# Patient Record
Sex: MALE | Employment: OTHER | URBAN - METROPOLITAN AREA
[De-identification: names, ages, dates, MRNs, and addresses within clinical notes are randomized per-mention and may not be internally consistent; named-entity substitution may affect disease eponyms.]

---

## 2019-11-08 ENCOUNTER — HOSPITAL ENCOUNTER (EMERGENCY)
Facility: CLINIC | Age: 39
Discharge: HOME | End: 2019-11-09
Attending: EMERGENCY MEDICINE | Admitting: EMERGENCY MEDICINE

## 2019-11-08 DIAGNOSIS — R10.10 PAIN OF UPPER ABDOMEN: ICD-10-CM

## 2019-11-08 DIAGNOSIS — K56.609 SMALL BOWEL OBSTRUCTION (H): ICD-10-CM

## 2019-11-08 PROCEDURE — 96374 THER/PROPH/DIAG INJ IV PUSH: CPT | Mod: 59

## 2019-11-08 PROCEDURE — 99285 EMERGENCY DEPT VISIT HI MDM: CPT | Mod: 25

## 2019-11-08 PROCEDURE — 96376 TX/PRO/DX INJ SAME DRUG ADON: CPT

## 2019-11-08 PROCEDURE — 25000128 H RX IP 250 OP 636: Performed by: NURSE PRACTITIONER

## 2019-11-08 PROCEDURE — 83690 ASSAY OF LIPASE: CPT | Performed by: NURSE PRACTITIONER

## 2019-11-08 PROCEDURE — 96375 TX/PRO/DX INJ NEW DRUG ADDON: CPT

## 2019-11-08 PROCEDURE — 85025 COMPLETE CBC W/AUTO DIFF WBC: CPT | Performed by: NURSE PRACTITIONER

## 2019-11-08 PROCEDURE — 80053 COMPREHEN METABOLIC PANEL: CPT | Performed by: NURSE PRACTITIONER

## 2019-11-08 RX ORDER — KETOROLAC TROMETHAMINE 15 MG/ML
15 INJECTION, SOLUTION INTRAMUSCULAR; INTRAVENOUS ONCE
Status: COMPLETED | OUTPATIENT
Start: 2019-11-08 | End: 2019-11-09

## 2019-11-08 RX ORDER — ONDANSETRON 2 MG/ML
8 INJECTION INTRAMUSCULAR; INTRAVENOUS EVERY 30 MIN PRN
Status: DISCONTINUED | OUTPATIENT
Start: 2019-11-08 | End: 2019-11-09 | Stop reason: HOSPADM

## 2019-11-08 RX ADMIN — ONDANSETRON HYDROCHLORIDE 8 MG: 2 INJECTION, SOLUTION INTRAMUSCULAR; INTRAVENOUS at 23:57

## 2019-11-08 SDOH — HEALTH STABILITY: MENTAL HEALTH: HOW OFTEN DO YOU HAVE A DRINK CONTAINING ALCOHOL?: NEVER

## 2019-11-08 NOTE — LETTER
11/09/19      To Whom it may concern:    Timo Garcia was in our Emergency Department today, 11/09/19. with a patient who needed their assistance.  Please excuse them from work/school.      Sincerely,

## 2019-11-08 NOTE — ED AVS SNAPSHOT
[facility] Emergency Department  [address]  [facility] [address]  Phone:  [phone]  Fax:  [phone]                                    Pramod Caldera   MRN: [id number]    Department:  [facility] Emergency Department   Date of Visit:  11/8/2019           After Visit Summary Signature Page    I have received my discharge instructions, and my questions have been answered. I have discussed any challenges I see with this plan with the nurse or doctor.    ..........................................................................................................................................  Patient/Patient Representative Signature      ..........................................................................................................................................  Patient Representative Print Name and Relationship to Patient    ..................................................               ................................................  Date                                   Time    ..........................................................................................................................................  Reviewed by Signature/Title    ...................................................              ..............................................  Date                                               Time          22EPIC Rev 08/18

## 2019-11-09 ENCOUNTER — APPOINTMENT (OUTPATIENT)
Dept: ULTRASOUND IMAGING | Facility: CLINIC | Age: 39
End: 2019-11-09
Attending: NURSE PRACTITIONER

## 2019-11-09 ENCOUNTER — APPOINTMENT (OUTPATIENT)
Dept: CT IMAGING | Facility: CLINIC | Age: 39
End: 2019-11-09
Attending: NURSE PRACTITIONER

## 2019-11-09 VITALS
TEMPERATURE: 98.7 F | SYSTOLIC BLOOD PRESSURE: 132 MMHG | RESPIRATION RATE: 18 BRPM | DIASTOLIC BLOOD PRESSURE: 68 MMHG | OXYGEN SATURATION: 100 % | WEIGHT: 187 LBS

## 2019-11-09 LAB
ALBUMIN SERPL-MCNC: 4.2 G/DL (ref 3.4–5)
ALP SERPL-CCNC: 75 U/L (ref 40–150)
ALT SERPL W P-5'-P-CCNC: 36 U/L (ref 0–70)
ANION GAP SERPL CALCULATED.3IONS-SCNC: 5 MMOL/L (ref 3–14)
AST SERPL W P-5'-P-CCNC: 23 U/L (ref 0–45)
BASOPHILS # BLD AUTO: 0 10E9/L (ref 0–0.2)
BASOPHILS NFR BLD AUTO: 0.4 %
BILIRUB SERPL-MCNC: 0.5 MG/DL (ref 0.2–1.3)
BUN SERPL-MCNC: 13 MG/DL (ref 7–30)
CALCIUM SERPL-MCNC: 8.7 MG/DL (ref 8.5–10.1)
CHLORIDE SERPL-SCNC: 104 MMOL/L (ref 94–109)
CO2 SERPL-SCNC: 29 MMOL/L (ref 20–32)
CREAT SERPL-MCNC: 0.86 MG/DL (ref 0.66–1.25)
DIFFERENTIAL METHOD BLD: NORMAL
EOSINOPHIL # BLD AUTO: 0 10E9/L (ref 0–0.7)
EOSINOPHIL NFR BLD AUTO: 0.3 %
ERYTHROCYTE [DISTWIDTH] IN BLOOD BY AUTOMATED COUNT: 12.3 % (ref 10–15)
GFR SERPL CREATININE-BSD FRML MDRD: >90 ML/MIN/{1.73_M2}
GLUCOSE SERPL-MCNC: 109 MG/DL (ref 70–99)
HCT VFR BLD AUTO: 44.7 % (ref 40–53)
HGB BLD-MCNC: 14.3 G/DL (ref 13.3–17.7)
IMM GRANULOCYTES # BLD: 0 10E9/L (ref 0–0.4)
IMM GRANULOCYTES NFR BLD: 0.3 %
LIPASE SERPL-CCNC: 140 U/L (ref 73–393)
LYMPHOCYTES # BLD AUTO: 1.7 10E9/L (ref 0.8–5.3)
LYMPHOCYTES NFR BLD AUTO: 16.3 %
MCH RBC QN AUTO: 29.2 PG (ref 26.5–33)
MCHC RBC AUTO-ENTMCNC: 32 G/DL (ref 31.5–36.5)
MCV RBC AUTO: 91 FL (ref 78–100)
MONOCYTES # BLD AUTO: 0.7 10E9/L (ref 0–1.3)
MONOCYTES NFR BLD AUTO: 6.4 %
NEUTROPHILS # BLD AUTO: 8 10E9/L (ref 1.6–8.3)
NEUTROPHILS NFR BLD AUTO: 76.3 %
NRBC # BLD AUTO: 0 10*3/UL
NRBC BLD AUTO-RTO: 0 /100
PLATELET # BLD AUTO: 186 10E9/L (ref 150–450)
POTASSIUM SERPL-SCNC: 3.7 MMOL/L (ref 3.4–5.3)
PROT SERPL-MCNC: 7.8 G/DL (ref 6.8–8.8)
RBC # BLD AUTO: 4.89 10E12/L (ref 4.4–5.9)
SODIUM SERPL-SCNC: 138 MMOL/L (ref 133–144)
WBC # BLD AUTO: 10.5 10E9/L (ref 4–11)

## 2019-11-09 PROCEDURE — 25000125 ZZHC RX 250: Performed by: EMERGENCY MEDICINE

## 2019-11-09 PROCEDURE — 74177 CT ABD & PELVIS W/CONTRAST: CPT

## 2019-11-09 PROCEDURE — 25000128 H RX IP 250 OP 636: Performed by: EMERGENCY MEDICINE

## 2019-11-09 PROCEDURE — 76705 ECHO EXAM OF ABDOMEN: CPT

## 2019-11-09 PROCEDURE — 25000128 H RX IP 250 OP 636: Performed by: NURSE PRACTITIONER

## 2019-11-09 RX ORDER — HYDROMORPHONE HYDROCHLORIDE 1 MG/ML
0.5 INJECTION, SOLUTION INTRAMUSCULAR; INTRAVENOUS; SUBCUTANEOUS
Status: COMPLETED | OUTPATIENT
Start: 2019-11-09 | End: 2019-11-09

## 2019-11-09 RX ORDER — IOPAMIDOL 755 MG/ML
500 INJECTION, SOLUTION INTRAVASCULAR ONCE
Status: COMPLETED | OUTPATIENT
Start: 2019-11-09 | End: 2019-11-09

## 2019-11-09 RX ORDER — ONDANSETRON 8 MG/1
8 TABLET, ORALLY DISINTEGRATING ORAL EVERY 6 HOURS PRN
Qty: 10 TABLET | Refills: 0 | Status: SHIPPED | OUTPATIENT
Start: 2019-11-09 | End: 2019-11-16

## 2019-11-09 RX ORDER — HYDROCODONE BITARTRATE AND ACETAMINOPHEN 5; 325 MG/1; MG/1
1 TABLET ORAL EVERY 6 HOURS PRN
Qty: 10 TABLET | Refills: 0 | Status: SHIPPED | OUTPATIENT
Start: 2019-11-09

## 2019-11-09 RX ADMIN — IOPAMIDOL 94 ML: 755 INJECTION, SOLUTION INTRAVENOUS at 01:42

## 2019-11-09 RX ADMIN — HYDROMORPHONE HYDROCHLORIDE 0.5 MG: 1 INJECTION, SOLUTION INTRAMUSCULAR; INTRAVENOUS; SUBCUTANEOUS at 00:24

## 2019-11-09 RX ADMIN — KETOROLAC TROMETHAMINE 15 MG: 15 INJECTION, SOLUTION INTRAMUSCULAR; INTRAVENOUS at 00:00

## 2019-11-09 RX ADMIN — HYDROMORPHONE HYDROCHLORIDE 0.5 MG: 1 INJECTION, SOLUTION INTRAMUSCULAR; INTRAVENOUS; SUBCUTANEOUS at 02:22

## 2019-11-09 RX ADMIN — SODIUM CHLORIDE 64 ML: 9 INJECTION, SOLUTION INTRAVENOUS at 01:42

## 2019-11-09 ASSESSMENT — ENCOUNTER SYMPTOMS
VOMITING: 0
DIARRHEA: 0
NAUSEA: 1
BACK PAIN: 0
ABDOMINAL PAIN: 1
DIZZINESS: 0
DIFFICULTY URINATING: 0
SHORTNESS OF BREATH: 0

## 2019-11-09 NOTE — ED NOTES
Emergency Department Attending Supervision Note  11/8/2019  11:41 PM      I evaluated this patient in conjunction with Laurence Zambrano NP      Briefly, Pramod Caldera is a 39 year old male who presents with acute onset of abdominal pain after eating pizza this evening. He has had no nausea or vomiting with it. Pain is mostly in the upper abdomen with his initial report.       On my exam, patient in discomfort in left lower and right lower abdomen and somewhat in the epigastric region as well. His abdomen was soft with no obvious distension. Bowel sounds were hypoactive. Heart and lungs were normal. He was not distressed.       Results:  CT Abdomen Pelvis w Contrast   Preliminary Result   IMPRESSION:    1.  Probable mild mid to distal small bowel obstruction: There is a moderately long segment of fluid-filled mildly distended small bowel in the lower abdomen and pelvis and the more distal bowel is relatively decompressed.   2.  No other cause of acute pain identified in the abdomen or pelvis. Normal appendix.       US Abdomen Limited (RUQ)   Final Result   IMPRESSION:   1.  Contracted gallbladder, suboptimally assessed. Recommend repeat study after fasting.   2.  The rest of the exam is negative.        Labs Ordered and Resulted from Time of ED Arrival Up to the Time of Departure from the ED   COMPREHENSIVE METABOLIC PANEL - Abnormal; Notable for the following components:       Result Value    Glucose 109 (*)     All other components within normal limits   CBC WITH PLATELETS DIFFERENTIAL   LIPASE   PERIPHERAL IV CATHETER          ED course:  Past medical records, nursing notes, and vitals reviewed.  2350: I performed an exam of the patient and obtained history, as documented above.     IV inserted and blood drawn.     The patient was sent for an abdomen pelvis CT and abdomen ultra sound while in the emergency department, findings above.    Findings and plan explained to the Patient. Patient discharged  home with instructions regarding supportive care, medications, and reasons to return. The importance of close follow-up was reviewed.        My impression:  Pramod Caldera is a 39 year old male who presented with acute onset of abdominal pain. Based on recent eating and location of his reported pain initially ultra sound of the right upper quadrant was obtained as well as lab tests. This did not show any evidence to suggest his gall bladder has any cause in his pain and on recheck his pain seemed more lower abdomen therefore CT imaging was obtained. This showed findings that could represent a mild mid distal small bowel obstruction as there appeared to be a transition point. There is no other abnormal findings to suggest the cause of his pain.  Patient has been feeling significantly better.  He was offered admission for observation but declined and wanted to be discharged home.  He is to stay on a liquid diet the next 24 hours and slowly advance his diet.  He understands reasons to return to the ED.       Diagnosis    ICD-10-CM    1. Pain of upper abdomen R10.10    2. Small bowel obstruction (H) K56.609        Scribe Disclosure:  I, Theo Jackson, am serving as a scribe at 11:42 PM on 11/8/2019 to document services personally performed by Marvin Hdz MD based on my observations and the provider's statements to me.      Marvin Hdz MD Mailander, Lucas P, MD  11/09/19 2006

## 2019-11-09 NOTE — ED PROVIDER NOTES
History     Chief Complaint:    Abdominal Pain      HPI   Pramod Do is a 39 year old male who presents with sudden onset of upper abdominal pain after eating pizza. Denies CP, SOB, vomiting, diarrhea, fevers, or chills.  Denies smoking or ETOH use.    Allergies:    No Known Allergies     Medications:      HYDROcodone-acetaminophen (NORCO) 5-325 MG tablet  ondansetron (ZOFRAN-ODT) 8 MG ODT tab        Problem List:      There are no active problems to display for this patient.       Past Medical History:      History reviewed. No pertinent past medical history.    Past Surgical History:      History reviewed. No pertinent surgical history.    Family History:      History reviewed. No pertinent family history.    Social History:    Marital Status:    Social History     Tobacco Use     Smoking status: Never Smoker     Smokeless tobacco: Never Used   Substance Use Topics     Alcohol use: Never     Frequency: Never     Drug use: Never        Review of Systems   Respiratory: Negative for shortness of breath.    Cardiovascular: Negative for chest pain and leg swelling.   Gastrointestinal: Positive for abdominal pain and nausea. Negative for diarrhea and vomiting.   Genitourinary: Negative for difficulty urinating.   Musculoskeletal: Negative for back pain.   Neurological: Negative for dizziness.         Physical Exam   First Vitals:  BP: (!) 149/89  Heart Rate: 71  Temp: 98.7  F (37.1  C)  Resp: 16  Weight: 84.8 kg (187 lb)  SpO2: 98 %      Physical Exam  Constitutional:       General: He is in acute distress.      Appearance: He is well-developed and normal weight. He is not ill-appearing or toxic-appearing.   HENT:      Mouth/Throat:      Mouth: Mucous membranes are moist.   Cardiovascular:      Rate and Rhythm: Normal rate and regular rhythm.      Heart sounds: Normal heart sounds.   Pulmonary:      Effort: Pulmonary effort is normal. No respiratory distress.      Breath sounds: Normal breath sounds.    Abdominal:      General: Abdomen is flat. Bowel sounds are normal. There is no distension. There are no signs of injury.      Palpations: Abdomen is soft. There is no hepatomegaly or splenomegaly.      Tenderness: There is tenderness in the right upper quadrant and left upper quadrant. There is no right CVA tenderness, left CVA tenderness or guarding. Negative signs include Torres's sign and McBurney's sign.      Hernia: No hernia is present.   Skin:     General: Skin is warm and dry.   Neurological:      General: No focal deficit present.      Mental Status: He is alert and oriented to person, place, and time.   Psychiatric:         Mood and Affect: Mood normal.         Behavior: Behavior normal.           Emergency Department Course   Imaging:  CT Abdomen Pelvis w Contrast   Final Result   IMPRESSION:    1.  Probable mild mid to distal small bowel obstruction: There is a moderately long segment of fluid-filled mildly distended small bowel in the lower abdomen and pelvis and the more distal bowel is relatively decompressed.   2.  No other cause of acute pain identified in the abdomen or pelvis. Normal appendix.       US Abdomen Limited (RUQ)   Final Result   IMPRESSION:   1.  Contracted gallbladder, suboptimally assessed. Recommend repeat study after fasting.   2.  The rest of the exam is negative.      Laboratory:  Labs Ordered and Resulted from Time of ED Arrival Up to the Time of Departure from the ED   COMPREHENSIVE METABOLIC PANEL - Abnormal; Notable for the following components:       Result Value    Glucose 109 (*)     All other components within normal limits   CBC WITH PLATELETS DIFFERENTIAL   LIPASE   PERIPHERAL IV CATHETER                    Impression & Plan            Medical Decision Making:Pramod Caldera presents with abdominal pain.  Discussed with him admission for small bowel obstruction but patient prefers to go home as he lives 5 minutes from ED and will return if needed.  He agrees to  clear liquid diet for the next 24 hours for bowel rest with the exception of pain and nausea medication. If unable to tolerate, patient states he will return to ED. The differential diagnosis may include:  appendicitis, cholecystitis, peptic ulcer disease, diverticulitis,  pancreatitis, amongst others.  Based on clinical exam, laboratory testing, and US/CT imaging, there is no evidence of appendicitis, pancreatitis, or cholecystitis.   The pain and nausea has improved with interventions in the ED.  He will be discharged, and instructed that if he has any persistent, worsening or new symptoms he should return to ED IMMEDIATELY.  He will establish primary care for follow-up on Monday.  Patient has verbalized understanding of discharge and all questions were answered.  Diagnosis:    ICD-10-CM    1. Pain of upper abdomen R10.10    2. Small bowel obstruction (H) K56.609        Disposition:  discharged to home with wife.      Discharge Medications:  New Prescriptions    HYDROCODONE-ACETAMINOPHEN (NORCO) 5-325 MG TABLET    Take 1 tablet by mouth every 6 hours as needed for severe pain    ONDANSETRON (ZOFRAN-ODT) 8 MG ODT TAB    Take 1 tablet (8 mg) by mouth every 6 hours as needed for nausea       Laurence Quintanilla NP  11/8/2019   [facility] EMERGENCY DEPARTMENT       Laurence Zambrano NP  11/09/19 0229     See supervisory Note     Marvin Hdz MD  11/09/19 2003

## 2019-11-09 NOTE — ED TRIAGE NOTES
Upper abdominal pain started at 1830 after eating a piece of pizza.  Reports feeling nauseous, but no vomiting.  Pt did not take any medication for the discomfort.

## 2020-04-20 ENCOUNTER — APPOINTMENT (OUTPATIENT)
Dept: INTERPRETER SERVICES | Facility: CLINIC | Age: 40
End: 2020-04-20